# Patient Record
Sex: MALE | Race: ASIAN | NOT HISPANIC OR LATINO | Employment: OTHER | URBAN - METROPOLITAN AREA
[De-identification: names, ages, dates, MRNs, and addresses within clinical notes are randomized per-mention and may not be internally consistent; named-entity substitution may affect disease eponyms.]

---

## 2022-01-13 ENCOUNTER — TELEPHONE (OUTPATIENT)
Dept: SCHEDULING | Facility: IMAGING CENTER | Age: 62
End: 2022-01-13

## 2022-02-16 ENCOUNTER — OFFICE VISIT (OUTPATIENT)
Dept: MEDICAL GROUP | Facility: PHYSICIAN GROUP | Age: 62
End: 2022-02-16
Payer: COMMERCIAL

## 2022-02-16 VITALS
OXYGEN SATURATION: 97 % | HEIGHT: 68 IN | RESPIRATION RATE: 20 BRPM | SYSTOLIC BLOOD PRESSURE: 118 MMHG | HEART RATE: 64 BPM | WEIGHT: 159.4 LBS | BODY MASS INDEX: 24.16 KG/M2 | TEMPERATURE: 97.8 F | DIASTOLIC BLOOD PRESSURE: 84 MMHG

## 2022-02-16 DIAGNOSIS — Z12.11 SCREENING FOR COLON CANCER: ICD-10-CM

## 2022-02-16 DIAGNOSIS — M17.0 PRIMARY OSTEOARTHRITIS OF BOTH KNEES: ICD-10-CM

## 2022-02-16 DIAGNOSIS — Z00.00 ENCOUNTER FOR MEDICAL EXAMINATION TO ESTABLISH CARE: ICD-10-CM

## 2022-02-16 DIAGNOSIS — E78.00 HYPERCHOLESTEROLEMIA: ICD-10-CM

## 2022-02-16 DIAGNOSIS — E03.9 ACQUIRED HYPOTHYROIDISM: ICD-10-CM

## 2022-02-16 DIAGNOSIS — R73.09 ELEVATED HEMOGLOBIN A1C: ICD-10-CM

## 2022-02-16 PROCEDURE — 99204 OFFICE O/P NEW MOD 45 MIN: CPT | Performed by: STUDENT IN AN ORGANIZED HEALTH CARE EDUCATION/TRAINING PROGRAM

## 2022-02-16 RX ORDER — AMOXICILLIN 500 MG
3000 CAPSULE ORAL DAILY
COMMUNITY

## 2022-02-16 RX ORDER — LEVOTHYROXINE SODIUM 112 UG/1
112 TABLET ORAL
COMMUNITY
End: 2022-02-16 | Stop reason: SDUPTHER

## 2022-02-16 RX ORDER — LEVOTHYROXINE SODIUM 112 UG/1
112 TABLET ORAL
Qty: 90 TABLET | Refills: 3 | Status: SHIPPED | OUTPATIENT
Start: 2022-02-16 | End: 2023-02-13

## 2022-02-16 ASSESSMENT — PATIENT HEALTH QUESTIONNAIRE - PHQ9: CLINICAL INTERPRETATION OF PHQ2 SCORE: 0

## 2022-02-16 NOTE — PROGRESS NOTES
"Subjective:     CC:  Diagnoses of Elevated hemoglobin A1c, Acquired hypothyroidism, Encounter for medical examination to establish care, Screening for colon cancer, Hypercholesterolemia, and Primary osteoarthritis of both knees were pertinent to this visit.    HISTORY OF THE PRESENT ILLNESS: Patient is a 61 y.o. male. This pleasant patient is here today to establish care.    1.  Elevated hemoglobin A1c  Longstanding.  Patient continues to monitor his diet.    2.  Acquired hypothyroidism  Longstanding.  Currently under treatment with levothyroxine 112 micrograms daily.  He reports no symptoms of hyper/hypothyroidism    3.  Hypercholesterolemia  Longstanding.  He is actively treating with a low-cholesterol diet.  Patient is currently under no medical treatment.      Active Diagnosis:    Patient Active Problem List   Diagnosis   • Elevated hemoglobin A1c   • Acquired hypothyroidism   • Hypercholesterolemia   • Primary osteoarthritis of both knees          Current Outpatient Medications Ordered in Epic   Medication Sig Dispense Refill   • Multiple Vitamin (MULTIVITAMIN ADULT PO) Take 1 Tablet by mouth every day.     • Omega-3 Fatty Acids (FISH OIL) 1200 MG Cap Take 3,000 mg by mouth every day.     • levothyroxine (SYNTHROID) 112 MCG Tab Take 1 Tablet by mouth every morning on an empty stomach. 90 Tablet 3     No current Epic-ordered facility-administered medications on file.     ROS:   Gen: No fevers/chills, no changes in weight  HEENT: No changes in vision/hearing, sore throat.  Pulm: No cough, unexplained SOB.  CV: No chest pain/pressure, no palpitations  GI: No nausea/vomiting, no diarrhea  : No dysuria/nocturia  MSk: No myalgias  Skin: No rash/skin changes    Treatments make sure through this  Objective:     Exam: /84 (BP Location: Left arm, Patient Position: Sitting, BP Cuff Size: Adult)   Pulse 64   Temp 36.6 °C (97.8 °F) (Temporal)   Resp 20   Ht 1.72 m (5' 7.7\")   Wt 72.3 kg (159 lb 6.4 oz)   SpO2 " 97%  Body mass index is 24.45 kg/m².    General: Normal appearing. No distress.  HEENT: Normocephalic. Eyes conjunctiva clear lids without ptosis, pupils equal and reactive to light accommodation. Ears normal shape and contour, canals are clear bilaterally, tympanic membranes are benign, nasal mucosa benign, oropharynx is without erythema, edema or exudates.   Neck: Supple without JVD Synagis 9 abdominal exams that. Thyroid is not enlarged.  Pulmonary: Clear to ausculation.  Normal effort. No rales, ronchi, or wheezing.  Cardiovascular: Regular rate and rhythm without murmur. Radial pulses are intact and equal bilaterally.  Abdomen: Nondistended.  Neurologic: Grossly nonfocal.  CN II through XII intact.  Lymph: No cervical or supraclavicular lymph nodes are palpable  Skin: Warm and dry.  No obvious lesions.  Musculoskeletal: Normal gait. No extremity cyanosis, clubbing, or edema.  Psych: Normal mood and affect. Alert and oriented x3. Judgment and insight are normal.    A chaperone was offered to the patient during today's exam. Patient declined chaperone.    Labs:   8/4/2021:  -Lipid profile, total cholesterol 216, triglycerides 97, HDL 55,   -Hemoglobin A1c, 6.1  -BMP, elevated fasting glucose of 106  -TSH 0.65  -PSA 0.33    Assessment & Plan:   61 y.o. male with the following -    1. Elevated hemoglobin A1c  -Chronic, stable.  -Patient continues to exercise and eat a healthy diet low in carbohydrates.    2. Acquired hypothyroidism  -Chronic, stable.  -Continue levothyroxine 112 mcg daily.  Dispense 90.  Refill 3.    3.  Hypercholesterolemia  -Chronic, stable.  Current ASCVD 7.5 percent.  -I discussed the option to treat with a statin type medication with the patient today.  He declines to do so at this point in time.  I let him know that this remains an option on the table for him.  He does have a significant family history of cardiovascular disease on his father side with many deaths in persons in their  40s and 50s.    4. Healthcare maintenance.  -We discussed diet/exercise/healthy weight maintenance.  Discussed the need for biannual dentistry visits and always wears seatbelt.  I recommend the patient receive his Shingrix vaccine at the local pharmacy of his choice.  - Referral to GI for Colonoscopy      Return in about 6 months (around 8/16/2022) for Hyperthyroidism.  We will obtain hepatitis C screening with TSH checked.    Please note that this dictation was created using voice recognition software. I have made every reasonable attempt to correct obvious errors, but I expect that there are errors of grammar and possibly content that I did not discover before finalizing the note.      Damian Mcgee PA-C 2/16/2022

## 2022-08-16 ENCOUNTER — OFFICE VISIT (OUTPATIENT)
Dept: MEDICAL GROUP | Facility: PHYSICIAN GROUP | Age: 62
End: 2022-08-16
Payer: COMMERCIAL

## 2022-08-16 VITALS
HEART RATE: 69 BPM | HEIGHT: 68 IN | DIASTOLIC BLOOD PRESSURE: 66 MMHG | RESPIRATION RATE: 16 BRPM | SYSTOLIC BLOOD PRESSURE: 122 MMHG | WEIGHT: 158.8 LBS | OXYGEN SATURATION: 100 % | BODY MASS INDEX: 24.07 KG/M2 | TEMPERATURE: 98.7 F

## 2022-08-16 DIAGNOSIS — E03.9 ACQUIRED HYPOTHYROIDISM: ICD-10-CM

## 2022-08-16 DIAGNOSIS — Z11.59 ENCOUNTER FOR HEPATITIS C SCREENING TEST FOR LOW RISK PATIENT: ICD-10-CM

## 2022-08-16 DIAGNOSIS — R73.09 ELEVATED HEMOGLOBIN A1C: ICD-10-CM

## 2022-08-16 DIAGNOSIS — E78.00 HYPERCHOLESTEROLEMIA: ICD-10-CM

## 2022-08-16 LAB
HBA1C MFR BLD: 6.1 % (ref 0–5.6)
INT CON NEG: NEGATIVE
INT CON POS: POSITIVE

## 2022-08-16 PROCEDURE — 83036 HEMOGLOBIN GLYCOSYLATED A1C: CPT | Performed by: STUDENT IN AN ORGANIZED HEALTH CARE EDUCATION/TRAINING PROGRAM

## 2022-08-16 PROCEDURE — 99214 OFFICE O/P EST MOD 30 MIN: CPT | Performed by: STUDENT IN AN ORGANIZED HEALTH CARE EDUCATION/TRAINING PROGRAM

## 2022-08-16 NOTE — PROGRESS NOTES
"CC:  Diagnoses of Elevated hemoglobin A1c, Acquired hypothyroidism, Hypercholesterolemia, and Encounter for hepatitis C screening test for low risk patient were pertinent to this visit.    HISTORY OF THE PRESENT ILLNESS: Patient is a 62 y.o. male. This pleasant patient is here today to discuss:    1. Elevated A1c  Longstanding history of elevated hemoglobin A1c.  Patient does find some difficulty in exercising due to his knee arthritis.  He is careful with his diet.    2. Acquired Hypothyroidism  Longstanding.  Patient is fully compliant with his levothyroxine 112 mcg daily.  He reports no symptoms of hyper/hypothyroidism.    3. Hypercholesterolemia.  Patient is not currently treating this with any medication.  He is careful with his diet.    No problem-specific Assessment & Plan notes found for this encounter.    Current Outpatient Medications Ordered in Epic   Medication Sig Dispense Refill    Multiple Vitamin (MULTIVITAMIN ADULT PO) Take 1 Tablet by mouth every day.      Omega-3 Fatty Acids (FISH OIL) 1200 MG Cap Take 3,000 mg by mouth every day.      levothyroxine (SYNTHROID) 112 MCG Tab Take 1 Tablet by mouth every morning on an empty stomach. 90 Tablet 3     No current Epic-ordered facility-administered medications on file.     ROS:   Gen: no fevers/chills, unplanned changes in weight  Pulm: no sob, no cough  CV: no chest pain/pressure, no palpitations  GI: no nausea/vomiting, no diarrhea  : no dysuria/nocturia > once per night  Skin: no rash  Neuro: no headaches, no numbness/tingling      Objective:     Exam: /66 (BP Location: Left arm, Patient Position: Sitting, BP Cuff Size: Adult)   Pulse 69   Temp 37.1 °C (98.7 °F) (Temporal)   Resp 16   Ht 1.72 m (5' 7.7\")   Wt 72 kg (158 lb 12.8 oz)   SpO2 100%  Body mass index is 24.36 kg/m².    General: Normal appearing. No distress.  HEENT: Normocephalic. Eyes conjunctiva clear lids without ptosis, pupils equal and reactive to light accommodation, ears " normal shape and contour, canals are clear bilaterally, tympanic membranes are benign, nasal mucosa benign, oropharynx is without erythema, edema or exudates.   Neck: Supple without JVD. Thyroid is not enlarged.  Pulmonary: Clear to ausculation.  Normal effort. No rales, ronchi, or wheezing.  Cardiovascular: Regular rate and rhythm without murmur. Radial pulses are intact and equal bilaterally.  Neurologic: Grossly nonfocal  Lymph: No cervical or supraclavicular lymph nodes are palpable  Skin: Warm and dry.  No obvious lesions.  Musculoskeletal: Normal gait. No extremity cyanosis, clubbing, or edema.  Psych: Normal mood and affect. Alert and oriented x3. Judgment and insight is normal.    A chaperone was offered to the patient during today's exam. Patient declined chaperone.    Labs:   8/16/2022:   -POCT hemoglobin A1c 6.1%    8/4/2021:  -Hemoglobin A1c 6.1%  -TSH 0.65  -Lipid profile showing total cholesterol 216, triglycerides 97, HDL 55, .      Assessment & Plan:   62 y.o. male with the following -    1. Elevated A1c  -Chronic, stable.  -I have encouraged the patient to continue to manage his diet very carefully and to attempt low impact exercises such as cycling or water aerobics to improve his hemoglobin A1c.    2. Acquired Hypothyroidism  -Chronic, stable.  -Continue levothyroxine 112 mcg daily.  -TSH with reflex T4.    3. Hypercholesterolemia.   -Chronic.  -Lipid profile.    Return in about 1 year (around 8/16/2023).    Please note that this dictation was created using voice recognition software. I have made every reasonable attempt to correct obvious errors, but I expect that there are errors of grammar and possibly content that I did not discover before finalizing the note.    Damian Mcgee PA-C 8/16/2022

## 2022-08-19 ENCOUNTER — HOSPITAL ENCOUNTER (OUTPATIENT)
Dept: LAB | Facility: MEDICAL CENTER | Age: 62
End: 2022-08-19
Attending: STUDENT IN AN ORGANIZED HEALTH CARE EDUCATION/TRAINING PROGRAM
Payer: COMMERCIAL

## 2022-08-19 DIAGNOSIS — Z11.59 ENCOUNTER FOR HEPATITIS C SCREENING TEST FOR LOW RISK PATIENT: ICD-10-CM

## 2022-08-19 DIAGNOSIS — E78.00 HYPERCHOLESTEROLEMIA: ICD-10-CM

## 2022-08-19 DIAGNOSIS — E03.9 ACQUIRED HYPOTHYROIDISM: ICD-10-CM

## 2022-08-19 LAB
CHOLEST SERPL-MCNC: 199 MG/DL (ref 100–199)
HCV AB SER QL: NORMAL
HDLC SERPL-MCNC: 41 MG/DL
LDLC SERPL CALC-MCNC: 138 MG/DL
TRIGL SERPL-MCNC: 98 MG/DL (ref 0–149)
TSH SERPL DL<=0.005 MIU/L-ACNC: 0.54 UIU/ML (ref 0.38–5.33)

## 2022-08-19 PROCEDURE — 86803 HEPATITIS C AB TEST: CPT

## 2022-08-19 PROCEDURE — 84443 ASSAY THYROID STIM HORMONE: CPT

## 2022-08-19 PROCEDURE — 80061 LIPID PANEL: CPT

## 2022-08-19 PROCEDURE — 36415 COLL VENOUS BLD VENIPUNCTURE: CPT

## 2023-02-13 RX ORDER — LEVOTHYROXINE SODIUM 112 UG/1
TABLET ORAL
Qty: 90 TABLET | Refills: 3 | Status: SHIPPED | OUTPATIENT
Start: 2023-02-13 | End: 2023-08-17 | Stop reason: SDUPTHER

## 2023-08-04 ENCOUNTER — DOCUMENTATION (OUTPATIENT)
Dept: HEALTH INFORMATION MANAGEMENT | Facility: OTHER | Age: 63
End: 2023-08-04
Payer: COMMERCIAL

## 2023-08-17 ENCOUNTER — OFFICE VISIT (OUTPATIENT)
Dept: MEDICAL GROUP | Facility: PHYSICIAN GROUP | Age: 63
End: 2023-08-17
Payer: COMMERCIAL

## 2023-08-17 VITALS
DIASTOLIC BLOOD PRESSURE: 72 MMHG | SYSTOLIC BLOOD PRESSURE: 104 MMHG | BODY MASS INDEX: 21.55 KG/M2 | OXYGEN SATURATION: 95 % | HEIGHT: 68 IN | WEIGHT: 142.2 LBS | TEMPERATURE: 97.9 F | HEART RATE: 82 BPM

## 2023-08-17 DIAGNOSIS — E03.9 ACQUIRED HYPOTHYROIDISM: ICD-10-CM

## 2023-08-17 DIAGNOSIS — R73.09 ELEVATED HEMOGLOBIN A1C: ICD-10-CM

## 2023-08-17 DIAGNOSIS — E78.00 HYPERCHOLESTEROLEMIA: ICD-10-CM

## 2023-08-17 DIAGNOSIS — Z00.00 HEALTHCARE MAINTENANCE: ICD-10-CM

## 2023-08-17 PROCEDURE — 3078F DIAST BP <80 MM HG: CPT | Performed by: STUDENT IN AN ORGANIZED HEALTH CARE EDUCATION/TRAINING PROGRAM

## 2023-08-17 PROCEDURE — 99214 OFFICE O/P EST MOD 30 MIN: CPT | Performed by: STUDENT IN AN ORGANIZED HEALTH CARE EDUCATION/TRAINING PROGRAM

## 2023-08-17 PROCEDURE — 3074F SYST BP LT 130 MM HG: CPT | Performed by: STUDENT IN AN ORGANIZED HEALTH CARE EDUCATION/TRAINING PROGRAM

## 2023-08-17 RX ORDER — LEVOTHYROXINE SODIUM 112 UG/1
112 TABLET ORAL
Qty: 90 TABLET | Refills: 3 | Status: SHIPPED | OUTPATIENT
Start: 2023-08-17 | End: 2023-08-24 | Stop reason: DRUGHIGH

## 2023-08-17 ASSESSMENT — PATIENT HEALTH QUESTIONNAIRE - PHQ9: CLINICAL INTERPRETATION OF PHQ2 SCORE: 0

## 2023-08-17 NOTE — PROGRESS NOTES
"Subjective:     CC:  Diagnoses of Healthcare maintenance, Acquired hypothyroidism, Elevated hemoglobin A1c, and Hypercholesterolemia were pertinent to this visit.    HISTORY OF THE PRESENT ILLNESS: Patient is a 63 y.o. male. This pleasant patient is here today to discuss:    1. Healthcare maintenance  Patient reports a mostly vegetarian diet, low in carbohydrates.  He gets regular dentistry.  He walks 10 to 15 minutes daily.    2. Acquired hypothyroidism  Levothyroxine 112 mcg daily.  Patient reports full compliance.  No symptoms of hyper/hypothyroidism.    3. Elevated hemoglobin A1c    4. Hypercholesterolemia        Active Diagnosis:    Patient Active Problem List   Diagnosis    Elevated hemoglobin A1c    Acquired hypothyroidism    Hypercholesterolemia    Primary osteoarthritis of both knees      Current Outpatient Medications Ordered in Epic   Medication Sig Dispense Refill    levothyroxine (SYNTHROID) 112 MCG Tab Take 1 Tablet by mouth every morning on an empty stomach. 90 Tablet 3    Multiple Vitamin (MULTIVITAMIN ADULT PO) Take 1 Tablet by mouth every day.      Omega-3 Fatty Acids (FISH OIL) 1200 MG Cap Take 3,000 mg by mouth every day.       No current [facility]-ordered facility-administered medications on file.     ROS:   Gen: No fevers/chills, no changes in weight  HEENT: No changes in vision/hearing, sore throat.  Pulm: No cough, unexplained SOB.  CV: No chest pain/pressure, no palpitations  GI: No nausea/vomiting, no diarrhea  : No dysuria/nocturia  MSk: No myalgias  Skin: No rash/skin changes  Neuro: No headaches, no numbness/tingling  Heme/Lymph: no easy bruising      Objective:     Exam: /72 (BP Location: Left arm, Patient Position: Sitting, BP Cuff Size: Adult)   Pulse 82   Temp 36.6 °C (97.9 °F) (Temporal)   Ht 1.727 m (5' 8\")   Wt 64.5 kg (142 lb 3.2 oz)   SpO2 95%  Body mass index is 21.62 kg/m².    General: Normal appearing. No distress.  HEENT: Normocephalic. Eyes conjunctiva clear lids " without ptosis. Pupils equal and reactive to light accommodation. Ears normal shape and contour. Canals are clear bilaterally, tympanic membranes are benign. Nasal mucosa benign, oropharynx is without erythema, edema or exudates.   Neck: Supple without JVD. Thyroid is not enlarged.  Pulmonary: Clear to ausculation.  Normal effort. No rales, ronchi, or wheezing.  Cardiovascular: Regular rate and rhythm without murmur. Radial pulses are intact and equal bilaterally.  Abdomen: Nondistended   neurologic: Grossly nonfocal.  CN II through XII intact.  Lymph: No cervical or supraclavicular lymph nodes are palpable  Skin: Warm and dry.  No obvious lesions.  Musculoskeletal: Normal gait. No extremity cyanosis, clubbing, or edema.  Psych: Normal mood and affect. Alert and oriented x3. Judgment and insight are normal.        Assessment & Plan:   63 y.o. male with the following -    Labs:   8/4/2021:  -BMP showing glucose 106.    8/6/2022:  -Hemoglobin A1c 6.1%.  -Lipid profile showing total cholesterol 189, triglycerides 98, HDL 41,   -TSH 0.54    1. Healthcare maintenance  -Patient's good dentistry and diet regimens were reinforced at today's visit.  Recommend increasing exercise to 30 minutes daily.  - CBC WITHOUT DIFFERENTIAL; Future  - Comp Metabolic Panel; Future  - Lipid Profile; Future  - TSH WITH REFLEX TO FT4; Future  - HEMOGLOBIN A1C; Future    2. Acquired hypothyroidism  -Chronic, stable.  - TSH WITH REFLEX TO FT4; Future  - levothyroxine (SYNTHROID) 112 MCG Tab; Take 1 Tablet by mouth every morning on an empty stomach.  Dispense: 90 Tablet; Refill: 3    3. Elevated hemoglobin A1c  -Chronic.  - Comp Metabolic Panel; Future  - HEMOGLOBIN A1C; Future    4. Hypercholesterolemia  -Chronic, subthreshold for medical intervention.  - Lipid Profile; Future        No follow-ups on file.    Please note that this dictation was created using voice recognition software. I have made every reasonable attempt to correct  obvious errors, but I expect that there are errors of grammar and possibly content that I did not discover before finalizing the note.      Damian Mcgee PA-C 8/17/2023

## 2023-08-23 ENCOUNTER — HOSPITAL ENCOUNTER (OUTPATIENT)
Dept: LAB | Facility: MEDICAL CENTER | Age: 63
End: 2023-08-23
Attending: STUDENT IN AN ORGANIZED HEALTH CARE EDUCATION/TRAINING PROGRAM
Payer: COMMERCIAL

## 2023-08-23 DIAGNOSIS — E03.9 ACQUIRED HYPOTHYROIDISM: ICD-10-CM

## 2023-08-23 DIAGNOSIS — R73.09 ELEVATED HEMOGLOBIN A1C: ICD-10-CM

## 2023-08-23 DIAGNOSIS — Z00.00 HEALTHCARE MAINTENANCE: ICD-10-CM

## 2023-08-23 DIAGNOSIS — E78.00 HYPERCHOLESTEROLEMIA: ICD-10-CM

## 2023-08-23 LAB
ALBUMIN SERPL BCP-MCNC: 4.3 G/DL (ref 3.2–4.9)
ALBUMIN/GLOB SERPL: 1.7 G/DL
ALP SERPL-CCNC: 53 U/L (ref 30–99)
ALT SERPL-CCNC: 24 U/L (ref 2–50)
ANION GAP SERPL CALC-SCNC: 12 MMOL/L (ref 7–16)
AST SERPL-CCNC: 23 U/L (ref 12–45)
BILIRUB SERPL-MCNC: 1.1 MG/DL (ref 0.1–1.5)
BUN SERPL-MCNC: 18 MG/DL (ref 8–22)
CALCIUM ALBUM COR SERPL-MCNC: 9.4 MG/DL (ref 8.5–10.5)
CALCIUM SERPL-MCNC: 9.6 MG/DL (ref 8.5–10.5)
CHLORIDE SERPL-SCNC: 103 MMOL/L (ref 96–112)
CHOLEST SERPL-MCNC: 156 MG/DL (ref 100–199)
CO2 SERPL-SCNC: 25 MMOL/L (ref 20–33)
CREAT SERPL-MCNC: 1.03 MG/DL (ref 0.5–1.4)
ERYTHROCYTE [DISTWIDTH] IN BLOOD BY AUTOMATED COUNT: 45.1 FL (ref 35.9–50)
EST. AVERAGE GLUCOSE BLD GHB EST-MCNC: 126 MG/DL
FASTING STATUS PATIENT QL REPORTED: NORMAL
GFR SERPLBLD CREATININE-BSD FMLA CKD-EPI: 82 ML/MIN/1.73 M 2
GLOBULIN SER CALC-MCNC: 2.5 G/DL (ref 1.9–3.5)
GLUCOSE SERPL-MCNC: 87 MG/DL (ref 65–99)
HBA1C MFR BLD: 6 % (ref 4–5.6)
HCT VFR BLD AUTO: 47 % (ref 42–52)
HDLC SERPL-MCNC: 48 MG/DL
HGB BLD-MCNC: 15.4 G/DL (ref 14–18)
LDLC SERPL CALC-MCNC: 98 MG/DL
MCH RBC QN AUTO: 31.8 PG (ref 27–33)
MCHC RBC AUTO-ENTMCNC: 32.8 G/DL (ref 32.3–36.5)
MCV RBC AUTO: 96.9 FL (ref 81.4–97.8)
PLATELET # BLD AUTO: 157 K/UL (ref 164–446)
PMV BLD AUTO: 10.8 FL (ref 9–12.9)
POTASSIUM SERPL-SCNC: 4.9 MMOL/L (ref 3.6–5.5)
PROT SERPL-MCNC: 6.8 G/DL (ref 6–8.2)
RBC # BLD AUTO: 4.85 M/UL (ref 4.7–6.1)
SODIUM SERPL-SCNC: 140 MMOL/L (ref 135–145)
TRIGL SERPL-MCNC: 50 MG/DL (ref 0–149)
TSH SERPL DL<=0.005 MIU/L-ACNC: 0.12 UIU/ML (ref 0.38–5.33)
WBC # BLD AUTO: 3.3 K/UL (ref 4.8–10.8)

## 2023-08-23 PROCEDURE — 80061 LIPID PANEL: CPT

## 2023-08-23 PROCEDURE — 80053 COMPREHEN METABOLIC PANEL: CPT

## 2023-08-23 PROCEDURE — 36415 COLL VENOUS BLD VENIPUNCTURE: CPT

## 2023-08-23 PROCEDURE — 83036 HEMOGLOBIN GLYCOSYLATED A1C: CPT

## 2023-08-23 PROCEDURE — 85027 COMPLETE CBC AUTOMATED: CPT

## 2023-08-23 PROCEDURE — 84439 ASSAY OF FREE THYROXINE: CPT

## 2023-08-23 PROCEDURE — 84443 ASSAY THYROID STIM HORMONE: CPT

## 2023-08-24 DIAGNOSIS — E03.9 ACQUIRED HYPOTHYROIDISM: ICD-10-CM

## 2023-08-24 LAB — T4 FREE SERPL-MCNC: 2.21 NG/DL (ref 0.93–1.7)

## 2023-08-24 RX ORDER — LEVOTHYROXINE SODIUM 0.1 MG/1
100 TABLET ORAL
Qty: 30 TABLET | Refills: 2 | Status: SHIPPED | OUTPATIENT
Start: 2023-08-24 | End: 2023-09-18

## 2023-10-09 ENCOUNTER — HOSPITAL ENCOUNTER (OUTPATIENT)
Dept: LAB | Facility: MEDICAL CENTER | Age: 63
End: 2023-10-09
Attending: STUDENT IN AN ORGANIZED HEALTH CARE EDUCATION/TRAINING PROGRAM
Payer: COMMERCIAL

## 2023-10-09 DIAGNOSIS — E03.9 ACQUIRED HYPOTHYROIDISM: ICD-10-CM

## 2023-10-09 LAB — TSH SERPL DL<=0.005 MIU/L-ACNC: 4.75 UIU/ML (ref 0.38–5.33)

## 2023-10-09 PROCEDURE — 84443 ASSAY THYROID STIM HORMONE: CPT

## 2023-10-09 PROCEDURE — 36415 COLL VENOUS BLD VENIPUNCTURE: CPT

## 2023-12-27 RX ORDER — LEVOTHYROXINE SODIUM 0.1 MG/1
100 TABLET ORAL
Qty: 30 TABLET | Refills: 0 | Status: SHIPPED | OUTPATIENT
Start: 2023-12-27 | End: 2024-01-03 | Stop reason: SDUPTHER

## 2023-12-27 NOTE — TELEPHONE ENCOUNTER
Received request via: Patient    Was the patient seen in the last year in this department? Yes. Also has [facility] appt with Juan Carlos Myles 01/03/2023    Does the patient have an active prescription (recently filled or refills available) for medication(s) requested? No    Does the patient have residential Plus and need 100 day supply (blood pressure, diabetes and cholesterol meds only)? Patient does not have SCP

## 2024-01-03 ENCOUNTER — OFFICE VISIT (OUTPATIENT)
Dept: MEDICAL GROUP | Facility: PHYSICIAN GROUP | Age: 64
End: 2024-01-03
Payer: COMMERCIAL

## 2024-01-03 VITALS
HEART RATE: 57 BPM | DIASTOLIC BLOOD PRESSURE: 70 MMHG | SYSTOLIC BLOOD PRESSURE: 100 MMHG | HEIGHT: 68 IN | WEIGHT: 147.2 LBS | BODY MASS INDEX: 22.31 KG/M2 | OXYGEN SATURATION: 97 % | TEMPERATURE: 96.9 F

## 2024-01-03 DIAGNOSIS — E78.00 HYPERCHOLESTEROLEMIA: ICD-10-CM

## 2024-01-03 DIAGNOSIS — Z23 NEED FOR VACCINATION: ICD-10-CM

## 2024-01-03 DIAGNOSIS — R73.03 PREDIABETES: ICD-10-CM

## 2024-01-03 DIAGNOSIS — D69.6 THROMBOCYTOPENIA (HCC): ICD-10-CM

## 2024-01-03 DIAGNOSIS — E03.9 ACQUIRED HYPOTHYROIDISM: ICD-10-CM

## 2024-01-03 PROCEDURE — 3074F SYST BP LT 130 MM HG: CPT

## 2024-01-03 PROCEDURE — 90686 IIV4 VACC NO PRSV 0.5 ML IM: CPT

## 2024-01-03 PROCEDURE — 3078F DIAST BP <80 MM HG: CPT

## 2024-01-03 PROCEDURE — 90471 IMMUNIZATION ADMIN: CPT

## 2024-01-03 PROCEDURE — 99214 OFFICE O/P EST MOD 30 MIN: CPT | Mod: 25

## 2024-01-03 RX ORDER — LEVOTHYROXINE SODIUM 0.1 MG/1
100 TABLET ORAL
Qty: 30 TABLET | Refills: 0 | Status: SHIPPED | OUTPATIENT
Start: 2024-01-03 | End: 2024-02-16

## 2024-01-03 SDOH — HEALTH STABILITY: PHYSICAL HEALTH: ON AVERAGE, HOW MANY MINUTES DO YOU ENGAGE IN EXERCISE AT THIS LEVEL?: 20 MIN

## 2024-01-03 SDOH — ECONOMIC STABILITY: HOUSING INSECURITY: IN THE LAST 12 MONTHS, HOW MANY PLACES HAVE YOU LIVED?: 1

## 2024-01-03 SDOH — ECONOMIC STABILITY: FOOD INSECURITY: WITHIN THE PAST 12 MONTHS, THE FOOD YOU BOUGHT JUST DIDN'T LAST AND YOU DIDN'T HAVE MONEY TO GET MORE.: NEVER TRUE

## 2024-01-03 SDOH — ECONOMIC STABILITY: HOUSING INSECURITY
IN THE LAST 12 MONTHS, WAS THERE A TIME WHEN YOU DID NOT HAVE A STEADY PLACE TO SLEEP OR SLEPT IN A SHELTER (INCLUDING NOW)?: NO

## 2024-01-03 SDOH — ECONOMIC STABILITY: INCOME INSECURITY: IN THE LAST 12 MONTHS, WAS THERE A TIME WHEN YOU WERE NOT ABLE TO PAY THE MORTGAGE OR RENT ON TIME?: NO

## 2024-01-03 SDOH — HEALTH STABILITY: MENTAL HEALTH
STRESS IS WHEN SOMEONE FEELS TENSE, NERVOUS, ANXIOUS, OR CAN'T SLEEP AT NIGHT BECAUSE THEIR MIND IS TROUBLED. HOW STRESSED ARE YOU?: ONLY A LITTLE

## 2024-01-03 SDOH — ECONOMIC STABILITY: TRANSPORTATION INSECURITY
IN THE PAST 12 MONTHS, HAS THE LACK OF TRANSPORTATION KEPT YOU FROM MEDICAL APPOINTMENTS OR FROM GETTING MEDICATIONS?: NO

## 2024-01-03 SDOH — HEALTH STABILITY: PHYSICAL HEALTH

## 2024-01-03 SDOH — ECONOMIC STABILITY: FOOD INSECURITY: WITHIN THE PAST 12 MONTHS, YOU WORRIED THAT YOUR FOOD WOULD RUN OUT BEFORE YOU GOT MONEY TO BUY MORE.: NEVER TRUE

## 2024-01-03 SDOH — ECONOMIC STABILITY: TRANSPORTATION INSECURITY
IN THE PAST 12 MONTHS, HAS LACK OF TRANSPORTATION KEPT YOU FROM MEETINGS, WORK, OR FROM GETTING THINGS NEEDED FOR DAILY LIVING?: NO

## 2024-01-03 SDOH — ECONOMIC STABILITY: INCOME INSECURITY: HOW HARD IS IT FOR YOU TO PAY FOR THE VERY BASICS LIKE FOOD, HOUSING, MEDICAL CARE, AND HEATING?: NOT HARD AT ALL

## 2024-01-03 SDOH — ECONOMIC STABILITY: TRANSPORTATION INSECURITY
IN THE PAST 12 MONTHS, HAS LACK OF RELIABLE TRANSPORTATION KEPT YOU FROM MEDICAL APPOINTMENTS, MEETINGS, WORK OR FROM GETTING THINGS NEEDED FOR DAILY LIVING?: NO

## 2024-01-03 ASSESSMENT — SOCIAL DETERMINANTS OF HEALTH (SDOH)
HOW OFTEN DO YOU ATTEND CHURCH OR RELIGIOUS SERVICES?: NEVER
HOW OFTEN DO YOU HAVE A DRINK CONTAINING ALCOHOL: MONTHLY OR LESS
HOW HARD IS IT FOR YOU TO PAY FOR THE VERY BASICS LIKE FOOD, HOUSING, MEDICAL CARE, AND HEATING?: NOT HARD AT ALL
HOW OFTEN DO YOU ATTENT MEETINGS OF THE CLUB OR ORGANIZATION YOU BELONG TO?: PATIENT DECLINED
WITHIN THE PAST 12 MONTHS, YOU WORRIED THAT YOUR FOOD WOULD RUN OUT BEFORE YOU GOT THE MONEY TO BUY MORE: NEVER TRUE
HOW OFTEN DO YOU GET TOGETHER WITH FRIENDS OR RELATIVES?: NEVER
HOW OFTEN DO YOU ATTEND CHURCH OR RELIGIOUS SERVICES?: NEVER
HOW OFTEN DO YOU GET TOGETHER WITH FRIENDS OR RELATIVES?: NEVER
HOW MANY DRINKS CONTAINING ALCOHOL DO YOU HAVE ON A TYPICAL DAY WHEN YOU ARE DRINKING: 1 OR 2
HOW OFTEN DO YOU HAVE SIX OR MORE DRINKS ON ONE OCCASION: NEVER
DO YOU BELONG TO ANY CLUBS OR ORGANIZATIONS SUCH AS CHURCH GROUPS UNIONS, FRATERNAL OR ATHLETIC GROUPS, OR SCHOOL GROUPS?: NO
IN A TYPICAL WEEK, HOW MANY TIMES DO YOU TALK ON THE PHONE WITH FAMILY, FRIENDS, OR NEIGHBORS?: NEVER
IN A TYPICAL WEEK, HOW MANY TIMES DO YOU TALK ON THE PHONE WITH FAMILY, FRIENDS, OR NEIGHBORS?: NEVER
DO YOU BELONG TO ANY CLUBS OR ORGANIZATIONS SUCH AS CHURCH GROUPS UNIONS, FRATERNAL OR ATHLETIC GROUPS, OR SCHOOL GROUPS?: NO
HOW OFTEN DO YOU ATTENT MEETINGS OF THE CLUB OR ORGANIZATION YOU BELONG TO?: PATIENT DECLINED

## 2024-01-03 ASSESSMENT — ENCOUNTER SYMPTOMS
BLURRED VISION: 0
MYALGIAS: 0
CONSTIPATION: 0
FEVER: 0
DIZZINESS: 0
HEADACHES: 0
NAUSEA: 0
WEAKNESS: 0
SHORTNESS OF BREATH: 0
ABDOMINAL PAIN: 0
WEIGHT LOSS: 0
COUGH: 0
CHILLS: 0
VOMITING: 0
DIARRHEA: 0

## 2024-01-03 ASSESSMENT — FIBROSIS 4 INDEX: FIB4 SCORE: 1.85

## 2024-01-03 ASSESSMENT — PATIENT HEALTH QUESTIONNAIRE - PHQ9: CLINICAL INTERPRETATION OF PHQ2 SCORE: 0

## 2024-01-03 ASSESSMENT — LIFESTYLE VARIABLES
HOW OFTEN DO YOU HAVE A DRINK CONTAINING ALCOHOL: MONTHLY OR LESS
HOW OFTEN DO YOU HAVE SIX OR MORE DRINKS ON ONE OCCASION: NEVER
SKIP TO QUESTIONS 9-10: 1
HOW MANY STANDARD DRINKS CONTAINING ALCOHOL DO YOU HAVE ON A TYPICAL DAY: 1 OR 2
AUDIT-C TOTAL SCORE: 1

## 2024-01-03 NOTE — ASSESSMENT & PLAN NOTE
Chronic, controlled. On levothyroxine 100 mcg per day and tolerating it well. Labs reviewed and last TSH in October 2023 was bio chemically stable, therefore continue current medication regimen.  Patient reports good energy level on the medication. Patient denies insomnia, tremor or change in appetite.  Patient is taking the medication on an empty stomach in the morning and waiting at least 30 minutes before eating.  TSH with reflex T4 to be drawn at least annually.

## 2024-01-03 NOTE — ASSESSMENT & PLAN NOTE
Chronic, controlled. The  last cholesterol panel in August 2023 was all within normal limits so we will continue the current lifestyle management  Recommend continuation of diligent efforts towards dietary improvements (Mediterranean Diet) and moderate intensity exercise at least 30 minutes per day most days of the week.  The 10-year ASCVD risk score (Tomas AGRAWAL, et al., 2019) is: 5.6%

## 2024-01-03 NOTE — PROGRESS NOTES
"Subjective:     CC:  Diagnoses of Need for vaccination, Hypercholesterolemia, Acquired hypothyroidism, Prediabetes, and Thrombocytopenia (HCC) were pertinent to this visit.    HISTORY OF THE PRESENT ILLNESS: Patient is a 63 y.o. male. This pleasant patient is here today to establish care and discuss the following problems:    Problem   Prediabetes    Chronic history of prediabetes for which patient is diet controlled most recent A1c trend reveals the following:  Lab Results   Component Value Date/Time    HBA1C 6.0 (H) 08/23/2023 01:38 PM    HBA1C 6.1 (A) 08/16/2022 01:28 PM        Acquired Hypothyroidism    Chronic condition for which patient takes levothyroxine 100 mcg daily.  Most recent lab shows biochemical stability.     Hypercholesterolemia    Chronic condition currently stable patient not on a statin  Lab Results   Component Value Date/Time    CHOLSTRLTOT 156 08/23/2023 01:38 PM    CHOLSTRLTOT 199 08/19/2022 10:05 AM    LDL 98 08/23/2023 01:38 PM     (H) 08/19/2022 10:05 AM    HDL 48 08/23/2023 01:38 PM    HDL 41 08/19/2022 10:05 AM    TRIGLYCERIDE 50 08/23/2023 01:38 PM    TRIGLYCERIDE 98 08/19/2022 10:05 AM              Health Maintenance: Completed    ROS:   Review of Systems   Constitutional:  Negative for chills, fever, malaise/fatigue and weight loss.   Eyes:  Negative for blurred vision.   Respiratory:  Negative for cough and shortness of breath.    Cardiovascular:  Negative for chest pain.   Gastrointestinal:  Negative for abdominal pain, constipation, diarrhea, nausea and vomiting.   Musculoskeletal:  Negative for myalgias.   Neurological:  Negative for dizziness, weakness and headaches.         Objective:     Exam: /70 (BP Location: Left arm, Patient Position: Sitting, BP Cuff Size: Adult)   Pulse (!) 57   Temp 36.1 °C (96.9 °F) (Temporal)   Ht 1.727 m (5' 8\")   Wt 66.8 kg (147 lb 3.2 oz)   SpO2 97%  Body mass index is 22.38 kg/m².    Physical Exam  Constitutional:       " "Appearance: Normal appearance.   HENT:      Head: Normocephalic.   Eyes:      Conjunctiva/sclera: Conjunctivae normal.      Pupils: Pupils are equal, round, and reactive to light.   Cardiovascular:      Rate and Rhythm: Normal rate and regular rhythm.      Heart sounds: No murmur heard.  Pulmonary:      Effort: Pulmonary effort is normal. No respiratory distress.      Breath sounds: Normal breath sounds.   Musculoskeletal:         General: Normal range of motion.   Skin:     General: Skin is warm and dry.   Neurological:      General: No focal deficit present.      Mental Status: He is alert and oriented to person, place, and time.   Psychiatric:         Mood and Affect: Mood normal.         Behavior: Behavior normal.           Labs:   No visits with results within 1 Month(s) from this visit.   Latest known visit with results is:   Hospital Outpatient Visit on 10/09/2023   Component Date Value    TSH 10/09/2023 4.750      Lab Results   Component Value Date/Time    CHOLSTRLTOT 156 08/23/2023 01:38 PM    LDL 98 08/23/2023 01:38 PM    HDL 48 08/23/2023 01:38 PM    TRIGLYCERIDE 50 08/23/2023 01:38 PM       Lab Results   Component Value Date/Time    SODIUM 140 08/23/2023 01:38 PM    POTASSIUM 4.9 08/23/2023 01:38 PM    CHLORIDE 103 08/23/2023 01:38 PM    CO2 25 08/23/2023 01:38 PM    GLUCOSE 87 08/23/2023 01:38 PM    BUN 18 08/23/2023 01:38 PM    CREATININE 1.03 08/23/2023 01:38 PM     Lab Results   Component Value Date/Time    ALKPHOSPHAT 53 08/23/2023 01:38 PM    ASTSGOT 23 08/23/2023 01:38 PM    ALTSGPT 24 08/23/2023 01:38 PM    TBILIRUBIN 1.1 08/23/2023 01:38 PM      Lab Results   Component Value Date/Time    HBA1C 6.0 (H) 08/23/2023 01:38 PM    HBA1C 6.1 (A) 08/16/2022 01:28 PM    HBA1C 6.1 (H) 08/04/2021 10:21 AM    HBA1C 6.2 (H) 07/09/2019 09:00 AM    HBA1C 6.0 (H) 03/28/2019 09:29 AM     No results found for: \"TSH\"  Lab Results   Component Value Date/Time    FREET4 2.21 (H) 08/23/2023 01:38 PM     No results " "found for: \"CBC\"      Assessment & Plan: Medical Decision Making   63 y.o. male with the following -    Problem List Items Addressed This Visit       Acquired hypothyroidism     Chronic, controlled. On levothyroxine 100 mcg per day and tolerating it well. Labs reviewed and last TSH in October 2023 was bio chemically stable, therefore continue current medication regimen.  Patient reports good energy level on the medication. Patient denies insomnia, tremor or change in appetite.  Patient is taking the medication on an empty stomach in the morning and waiting at least 30 minutes before eating.  TSH with reflex T4 to be drawn at least annually.           Relevant Medications    levothyroxine (SYNTHROID) 100 MCG Tab    Other Relevant Orders    TSH WITH REFLEX TO FT4    Hypercholesterolemia     Chronic, controlled. The  last cholesterol panel in August 2023 was all within normal limits so we will continue the current lifestyle management  Recommend continuation of diligent efforts towards dietary improvements (Mediterranean Diet) and moderate intensity exercise at least 30 minutes per day most days of the week.  The 10-year ASCVD risk score (Tomas AGRAWAL, et al., 2019) is: 5.6%           Relevant Orders    Comp Metabolic Panel    Lipid Profile    Prediabetes     Chronic condition stable  -Exercise: At least 150 minutes of moderate aerobic activity per week or 75 minutes of vigorous aerobic activity per week, +2 days/week of strength training  - Healthy lifestyle and eating habits: Mediterranean-based diet (rich in fruits, vegetables, nuts and healthy oils), proper hydration and avoiding sugary beverages, adequate sleep hygiene-(allowing 7 to 8 hours of overnight sleep).           Relevant Orders    HEMOGLOBIN A1C    Comp Metabolic Panel     Other Visit Diagnoses       Need for vaccination        Relevant Orders    INFLUENZA VACCINE QUAD INJ (PF) (Completed)    Thrombocytopenia (HCC)        Relevant Orders    CBC WITH " DIFFERENTIAL            Differential diagnosis, natural history, supportive care, and indications for immediate follow-up discussed.  Shared decision making approach was utilized, and patient is amendable with plan of care.  Patient understands to return to clinic or go to the emergency department if symptoms worsen. All questions and concerns addressed to the best of my knowledge.      Return in about 7 months (around 8/3/2024).    Please note that this dictation was created using voice recognition software. I have made every reasonable attempt to correct obvious errors, but I expect that there are errors of grammar and possibly content that I did not discover before finalizing the note.

## 2024-02-16 DIAGNOSIS — E03.9 ACQUIRED HYPOTHYROIDISM: ICD-10-CM

## 2024-02-16 RX ORDER — LEVOTHYROXINE SODIUM 0.1 MG/1
100 TABLET ORAL
Qty: 90 TABLET | Refills: 3 | Status: SHIPPED | OUTPATIENT
Start: 2024-02-16

## 2024-02-16 NOTE — TELEPHONE ENCOUNTER
Received request via: Pharmacy    Was the patient seen in the last year in this department? Yes    Does the patient have an active prescription (recently filled or refills available) for medication(s) requested? No      Does the patient have residential Plus and need 100 day supply (blood pressure, diabetes and cholesterol meds only)? Patient does not have SCP

## 2024-02-26 ENCOUNTER — DOCUMENTATION (OUTPATIENT)
Dept: HEALTH INFORMATION MANAGEMENT | Facility: OTHER | Age: 64
End: 2024-02-26
Payer: COMMERCIAL

## 2024-08-01 ENCOUNTER — HOSPITAL ENCOUNTER (OUTPATIENT)
Dept: LAB | Facility: MEDICAL CENTER | Age: 64
End: 2024-08-01
Payer: COMMERCIAL

## 2024-08-01 DIAGNOSIS — R73.03 PREDIABETES: ICD-10-CM

## 2024-08-01 DIAGNOSIS — E78.00 HYPERCHOLESTEROLEMIA: ICD-10-CM

## 2024-08-01 DIAGNOSIS — E03.9 ACQUIRED HYPOTHYROIDISM: ICD-10-CM

## 2024-08-01 DIAGNOSIS — D69.6 THROMBOCYTOPENIA (HCC): ICD-10-CM

## 2024-08-01 LAB
ALBUMIN SERPL BCP-MCNC: 4.2 G/DL (ref 3.2–4.9)
ALBUMIN/GLOB SERPL: 1.7 G/DL
ALP SERPL-CCNC: 43 U/L (ref 30–99)
ALT SERPL-CCNC: 37 U/L (ref 2–50)
ANION GAP SERPL CALC-SCNC: 10 MMOL/L (ref 7–16)
AST SERPL-CCNC: 27 U/L (ref 12–45)
BASOPHILS # BLD AUTO: 0.6 % (ref 0–1.8)
BASOPHILS # BLD: 0.02 K/UL (ref 0–0.12)
BILIRUB SERPL-MCNC: 1.1 MG/DL (ref 0.1–1.5)
BUN SERPL-MCNC: 26 MG/DL (ref 8–22)
CALCIUM ALBUM COR SERPL-MCNC: 8.8 MG/DL (ref 8.5–10.5)
CALCIUM SERPL-MCNC: 9 MG/DL (ref 8.5–10.5)
CHLORIDE SERPL-SCNC: 104 MMOL/L (ref 96–112)
CHOLEST SERPL-MCNC: 192 MG/DL (ref 100–199)
CO2 SERPL-SCNC: 25 MMOL/L (ref 20–33)
CREAT SERPL-MCNC: 1.14 MG/DL (ref 0.5–1.4)
EOSINOPHIL # BLD AUTO: 0.08 K/UL (ref 0–0.51)
EOSINOPHIL NFR BLD: 2.3 % (ref 0–6.9)
ERYTHROCYTE [DISTWIDTH] IN BLOOD BY AUTOMATED COUNT: 50.4 FL (ref 35.9–50)
EST. AVERAGE GLUCOSE BLD GHB EST-MCNC: 123 MG/DL
GFR SERPLBLD CREATININE-BSD FMLA CKD-EPI: 72 ML/MIN/1.73 M 2
GLOBULIN SER CALC-MCNC: 2.5 G/DL (ref 1.9–3.5)
GLUCOSE SERPL-MCNC: 95 MG/DL (ref 65–99)
HBA1C MFR BLD: 5.9 % (ref 4–5.6)
HCT VFR BLD AUTO: 46 % (ref 42–52)
HDLC SERPL-MCNC: 54 MG/DL
HGB BLD-MCNC: 15 G/DL (ref 14–18)
IMM GRANULOCYTES # BLD AUTO: 0 K/UL (ref 0–0.11)
IMM GRANULOCYTES NFR BLD AUTO: 0 % (ref 0–0.9)
LDLC SERPL CALC-MCNC: 123 MG/DL
LYMPHOCYTES # BLD AUTO: 1.78 K/UL (ref 1–4.8)
LYMPHOCYTES NFR BLD: 50.9 % (ref 22–41)
MCH RBC QN AUTO: 31.5 PG (ref 27–33)
MCHC RBC AUTO-ENTMCNC: 32.6 G/DL (ref 32.3–36.5)
MCV RBC AUTO: 96.6 FL (ref 81.4–97.8)
MONOCYTES # BLD AUTO: 0.21 K/UL (ref 0–0.85)
MONOCYTES NFR BLD AUTO: 6 % (ref 0–13.4)
NEUTROPHILS # BLD AUTO: 1.41 K/UL (ref 1.82–7.42)
NEUTROPHILS NFR BLD: 40.2 % (ref 44–72)
NRBC # BLD AUTO: 0 K/UL
NRBC BLD-RTO: 0 /100 WBC (ref 0–0.2)
PLATELET # BLD AUTO: 168 K/UL (ref 164–446)
PMV BLD AUTO: 11 FL (ref 9–12.9)
POTASSIUM SERPL-SCNC: 4.1 MMOL/L (ref 3.6–5.5)
PROT SERPL-MCNC: 6.7 G/DL (ref 6–8.2)
RBC # BLD AUTO: 4.76 M/UL (ref 4.7–6.1)
SODIUM SERPL-SCNC: 139 MMOL/L (ref 135–145)
TRIGL SERPL-MCNC: 74 MG/DL (ref 0–149)
TSH SERPL DL<=0.005 MIU/L-ACNC: 2.42 UIU/ML (ref 0.38–5.33)
WBC # BLD AUTO: 3.5 K/UL (ref 4.8–10.8)

## 2024-08-01 PROCEDURE — 80061 LIPID PANEL: CPT

## 2024-08-01 PROCEDURE — 36415 COLL VENOUS BLD VENIPUNCTURE: CPT

## 2024-08-01 PROCEDURE — 84443 ASSAY THYROID STIM HORMONE: CPT

## 2024-08-01 PROCEDURE — 85025 COMPLETE CBC W/AUTO DIFF WBC: CPT

## 2024-08-01 PROCEDURE — 80053 COMPREHEN METABOLIC PANEL: CPT

## 2024-08-01 PROCEDURE — 83036 HEMOGLOBIN GLYCOSYLATED A1C: CPT

## 2024-10-02 ENCOUNTER — HOSPITAL ENCOUNTER (OUTPATIENT)
Dept: LAB | Facility: MEDICAL CENTER | Age: 64
End: 2024-10-02
Payer: COMMERCIAL

## 2024-10-02 ENCOUNTER — HOSPITAL ENCOUNTER (OUTPATIENT)
Dept: RADIOLOGY | Facility: MEDICAL CENTER | Age: 64
End: 2024-10-02
Payer: COMMERCIAL

## 2024-10-02 ENCOUNTER — OFFICE VISIT (OUTPATIENT)
Dept: MEDICAL GROUP | Facility: PHYSICIAN GROUP | Age: 64
End: 2024-10-02
Payer: COMMERCIAL

## 2024-10-02 VITALS
SYSTOLIC BLOOD PRESSURE: 110 MMHG | HEIGHT: 68 IN | TEMPERATURE: 98.1 F | DIASTOLIC BLOOD PRESSURE: 80 MMHG | OXYGEN SATURATION: 94 % | WEIGHT: 148.6 LBS | BODY MASS INDEX: 22.52 KG/M2 | HEART RATE: 70 BPM

## 2024-10-02 DIAGNOSIS — N52.9 ERECTILE DYSFUNCTION, UNSPECIFIED ERECTILE DYSFUNCTION TYPE: ICD-10-CM

## 2024-10-02 DIAGNOSIS — M54.12 CERVICAL RADICULAR PAIN: ICD-10-CM

## 2024-10-02 DIAGNOSIS — R73.03 PREDIABETES: ICD-10-CM

## 2024-10-02 DIAGNOSIS — Z23 NEED FOR VACCINATION: ICD-10-CM

## 2024-10-02 DIAGNOSIS — E03.9 ACQUIRED HYPOTHYROIDISM: ICD-10-CM

## 2024-10-02 DIAGNOSIS — Z00.00 HEALTHCARE MAINTENANCE: ICD-10-CM

## 2024-10-02 DIAGNOSIS — E78.00 HYPERCHOLESTEROLEMIA: ICD-10-CM

## 2024-10-02 PROCEDURE — 84403 ASSAY OF TOTAL TESTOSTERONE: CPT

## 2024-10-02 PROCEDURE — 3074F SYST BP LT 130 MM HG: CPT

## 2024-10-02 PROCEDURE — 90471 IMMUNIZATION ADMIN: CPT

## 2024-10-02 PROCEDURE — 84402 ASSAY OF FREE TESTOSTERONE: CPT

## 2024-10-02 PROCEDURE — 36415 COLL VENOUS BLD VENIPUNCTURE: CPT

## 2024-10-02 PROCEDURE — 90656 IIV3 VACC NO PRSV 0.5 ML IM: CPT

## 2024-10-02 PROCEDURE — 3079F DIAST BP 80-89 MM HG: CPT

## 2024-10-02 PROCEDURE — 84270 ASSAY OF SEX HORMONE GLOBUL: CPT

## 2024-10-02 PROCEDURE — 72040 X-RAY EXAM NECK SPINE 2-3 VW: CPT

## 2024-10-02 PROCEDURE — 99214 OFFICE O/P EST MOD 30 MIN: CPT | Mod: 25

## 2024-10-02 RX ORDER — SILDENAFIL 100 MG/1
100 TABLET, FILM COATED ORAL
Qty: 10 TABLET | Refills: 3 | Status: SHIPPED | OUTPATIENT
Start: 2024-10-02

## 2024-10-02 SDOH — HEALTH STABILITY: PHYSICAL HEALTH: ON AVERAGE, HOW MANY MINUTES DO YOU ENGAGE IN EXERCISE AT THIS LEVEL?: 10 MIN

## 2024-10-02 SDOH — HEALTH STABILITY: PHYSICAL HEALTH: ON AVERAGE, HOW MANY DAYS PER WEEK DO YOU ENGAGE IN MODERATE TO STRENUOUS EXERCISE (LIKE A BRISK WALK)?: 3 DAYS

## 2024-10-02 SDOH — ECONOMIC STABILITY: INCOME INSECURITY: IN THE LAST 12 MONTHS, WAS THERE A TIME WHEN YOU WERE NOT ABLE TO PAY THE MORTGAGE OR RENT ON TIME?: NO

## 2024-10-02 SDOH — ECONOMIC STABILITY: FOOD INSECURITY: WITHIN THE PAST 12 MONTHS, YOU WORRIED THAT YOUR FOOD WOULD RUN OUT BEFORE YOU GOT MONEY TO BUY MORE.: NEVER TRUE

## 2024-10-02 SDOH — ECONOMIC STABILITY: FOOD INSECURITY: WITHIN THE PAST 12 MONTHS, THE FOOD YOU BOUGHT JUST DIDN'T LAST AND YOU DIDN'T HAVE MONEY TO GET MORE.: NEVER TRUE

## 2024-10-02 SDOH — HEALTH STABILITY: MENTAL HEALTH
STRESS IS WHEN SOMEONE FEELS TENSE, NERVOUS, ANXIOUS, OR CAN'T SLEEP AT NIGHT BECAUSE THEIR MIND IS TROUBLED. HOW STRESSED ARE YOU?: NOT AT ALL

## 2024-10-02 SDOH — ECONOMIC STABILITY: INCOME INSECURITY: HOW HARD IS IT FOR YOU TO PAY FOR THE VERY BASICS LIKE FOOD, HOUSING, MEDICAL CARE, AND HEATING?: NOT HARD AT ALL

## 2024-10-02 ASSESSMENT — SOCIAL DETERMINANTS OF HEALTH (SDOH)
HOW OFTEN DO YOU HAVE SIX OR MORE DRINKS ON ONE OCCASION: NEVER
WITHIN THE PAST 12 MONTHS, YOU WORRIED THAT YOUR FOOD WOULD RUN OUT BEFORE YOU GOT THE MONEY TO BUY MORE: NEVER TRUE
HOW OFTEN DO YOU ATTEND CHURCH OR RELIGIOUS SERVICES?: NEVER
HOW OFTEN DO YOU GET TOGETHER WITH FRIENDS OR RELATIVES?: NEVER
HOW OFTEN DO YOU ATTENT MEETINGS OF THE CLUB OR ORGANIZATION YOU BELONG TO?: NEVER
IN THE PAST 12 MONTHS, HAS THE ELECTRIC, GAS, OIL, OR WATER COMPANY THREATENED TO SHUT OFF SERVICE IN YOUR HOME?: NO
HOW HARD IS IT FOR YOU TO PAY FOR THE VERY BASICS LIKE FOOD, HOUSING, MEDICAL CARE, AND HEATING?: NOT HARD AT ALL
HOW OFTEN DO YOU GET TOGETHER WITH FRIENDS OR RELATIVES?: NEVER
IN A TYPICAL WEEK, HOW MANY TIMES DO YOU TALK ON THE PHONE WITH FAMILY, FRIENDS, OR NEIGHBORS?: NEVER
HOW OFTEN DO YOU ATTENT MEETINGS OF THE CLUB OR ORGANIZATION YOU BELONG TO?: NEVER
IN A TYPICAL WEEK, HOW MANY TIMES DO YOU TALK ON THE PHONE WITH FAMILY, FRIENDS, OR NEIGHBORS?: NEVER
HOW MANY DRINKS CONTAINING ALCOHOL DO YOU HAVE ON A TYPICAL DAY WHEN YOU ARE DRINKING: PATIENT DOES NOT DRINK
DO YOU BELONG TO ANY CLUBS OR ORGANIZATIONS SUCH AS CHURCH GROUPS UNIONS, FRATERNAL OR ATHLETIC GROUPS, OR SCHOOL GROUPS?: NO
HOW OFTEN DO YOU HAVE A DRINK CONTAINING ALCOHOL: NEVER
DO YOU BELONG TO ANY CLUBS OR ORGANIZATIONS SUCH AS CHURCH GROUPS UNIONS, FRATERNAL OR ATHLETIC GROUPS, OR SCHOOL GROUPS?: NO
HOW OFTEN DO YOU ATTEND CHURCH OR RELIGIOUS SERVICES?: NEVER

## 2024-10-02 ASSESSMENT — LIFESTYLE VARIABLES
AUDIT-C TOTAL SCORE: 0
HOW MANY STANDARD DRINKS CONTAINING ALCOHOL DO YOU HAVE ON A TYPICAL DAY: PATIENT DOES NOT DRINK
SKIP TO QUESTIONS 9-10: 1
HOW OFTEN DO YOU HAVE SIX OR MORE DRINKS ON ONE OCCASION: NEVER
HOW OFTEN DO YOU HAVE A DRINK CONTAINING ALCOHOL: NEVER

## 2024-10-02 ASSESSMENT — ENCOUNTER SYMPTOMS
NECK PAIN: 1
DIZZINESS: 0
VOMITING: 0
FEVER: 0
DIARRHEA: 0
WEAKNESS: 0
COUGH: 0
SHORTNESS OF BREATH: 0
CHILLS: 0
NAUSEA: 0
WEIGHT LOSS: 0
CONSTIPATION: 0
HEADACHES: 0
ABDOMINAL PAIN: 0
BLURRED VISION: 0
MYALGIAS: 0
TINGLING: 1

## 2024-10-02 ASSESSMENT — FIBROSIS 4 INDEX: FIB4 SCORE: 1.66

## 2024-10-03 DIAGNOSIS — M54.12 CERVICAL RADICULAR PAIN: ICD-10-CM

## 2024-10-03 DIAGNOSIS — M43.02 CERVICAL SPONDYLOLYSIS: ICD-10-CM

## 2024-10-05 LAB
SHBG SERPL-SCNC: 31 NMOL/L (ref 19–76)
TESTOST FREE MFR SERPL: 1.8 % (ref 1.6–2.9)
TESTOST FREE SERPL-MCNC: 29 PG/ML (ref 47–244)
TESTOST SERPL-MCNC: 165 NG/DL (ref 300–720)

## 2024-10-07 DIAGNOSIS — R79.89 LOW TESTOSTERONE IN MALE: ICD-10-CM

## 2024-10-07 DIAGNOSIS — E29.1 HYPOGONADISM MALE: ICD-10-CM

## 2024-11-04 ENCOUNTER — OFFICE VISIT (OUTPATIENT)
Dept: UROLOGY | Facility: MEDICAL CENTER | Age: 64
End: 2024-11-04
Payer: COMMERCIAL

## 2024-11-04 DIAGNOSIS — E29.1 HYPOGONADISM IN MALE: ICD-10-CM

## 2024-11-04 PROCEDURE — 99204 OFFICE O/P NEW MOD 45 MIN: CPT | Performed by: STUDENT IN AN ORGANIZED HEALTH CARE EDUCATION/TRAINING PROGRAM

## 2024-11-04 NOTE — PROGRESS NOTES
Subjective  Lon Swanson is a 64 y.o. male who presents today for evaluation of low testosterone.    Patient states his main reason for seeking evaluation was for erectile function.  He also notes general poor energy levels, difficulty building and maintaining muscle mass.  He has never been on testosterone replacement therapy in the past. Prior testosterone value was 165 ng/dL 10/2/24.    Family History   Problem Relation Age of Onset    Hypertension Mother     Lung Disease Father     Cancer Father         lung    Heart Disease Sister        Social History     Socioeconomic History    Marital status:      Spouse name: Not on file    Number of children: Not on file    Years of education: Not on file    Highest education level: Bachelor's degree (e.g., BA, AB, BS)   Occupational History    Not on file   Tobacco Use    Smoking status: Former     Current packs/day: 0.00     Types: Cigarettes     Quit date:      Years since quittin.8    Smokeless tobacco: Never   Vaping Use    Vaping status: Never Used   Substance and Sexual Activity    Alcohol use: Not Currently    Drug use: Not Currently    Sexual activity: Yes     Partners: Female     Birth control/protection: None   Other Topics Concern    Not on file   Social History Narrative    Not on file     Social Drivers of Health     Financial Resource Strain: Low Risk  (10/2/2024)    Overall Financial Resource Strain (CARDIA)     Difficulty of Paying Living Expenses: Not hard at all   Food Insecurity: No Food Insecurity (10/2/2024)    Hunger Vital Sign     Worried About Running Out of Food in the Last Year: Never true     Ran Out of Food in the Last Year: Never true   Transportation Needs: No Transportation Needs (10/2/2024)    PRAPARE - Transportation     Lack of Transportation (Medical): No     Lack of Transportation (Non-Medical): No   Physical Activity: Insufficiently Active (10/2/2024)    Exercise Vital Sign     Days of Exercise per Week: 3 days      Minutes of Exercise per Session: 10 min   Stress: No Stress Concern Present (10/2/2024)    [employer] [employer] - Occupational Stress Questionnaire     Feeling of Stress : Not at all   Social Connections: Socially Isolated (10/2/2024)    Social Connection and Isolation Panel [NHANES]     Frequency of Communication with Friends and Family: Never     Frequency of Social Gatherings with Friends and Family: Never     Attends Voodoo Services: Never     Active Member of Clubs or Organizations: No     Attends Club or Organization Meetings: Never     Marital Status:    Intimate Partner Violence: Not on file   Housing Stability: Unknown (10/2/2024)    Housing Stability Vital Sign     Unable to Pay for Housing in the Last Year: No     Number of Times Moved in the Last Year: Not on file     Homeless in the Last Year: No       Past Surgical History:   Procedure Laterality Date    LUMBAR LAMINECTOMY DISKECTOMY  2014       Past Medical History:   Diagnosis Date    Degenerative disc disease, lumbar     Hyperlipidemia     Thyroid disease        Current Outpatient Medications   Medication Sig Dispense Refill    sildenafil citrate (VIAGRA) 100 MG tablet Take 1 Tablet by mouth 1 time a day as needed for Erectile Dysfunction. 10 Tablet 3    levothyroxine (SYNTHROID) 100 MCG Tab TAKE 1 TABLET BY MOUTH EVERY DAY IN THE MORNING ON AN EMPTY STOMACH 90 Tablet 3    Multiple Vitamin (MULTIVITAMIN ADULT PO) Take 1 Tablet by mouth every day.      Omega-3 Fatty Acids (FISH OIL) 1200 MG Cap Take 3,000 mg by mouth every day.       No current facility-administered medications for this visit.       Allergies   Allergen Reactions    Methimazole       Create Date: [id number] Create User Name: Javid Silva Update Date: [id number] Update User Name: Javid Silva    Propylthiouracil      Other reaction(s): Rash, urticarial   Create Date: [id number] Create User Name: Javid Silva Update Date: [id number] Update User  Name: Javid Silva       Objective  There were no vitals taken for this visit.  Physical Exam  Constitutional:       Appearance: Normal appearance.   HENT:      Head: Normocephalic and atraumatic.   Eyes:      Extraocular Movements: Extraocular movements intact.      Conjunctiva/sclera: Conjunctivae normal.   Pulmonary:      Effort: No respiratory distress.   Musculoskeletal:      Cervical back: Normal range of motion.   Skin:     General: Skin is warm and dry.   Neurological:      General: No focal deficit present.      Mental Status: He is alert.   Psychiatric:         Mood and Affect: Mood normal.         Labs:   INFERTILITY   Lab Results   Component Value Date/Time    TESTOSTERONE 165 (L) 10/02/2024 1220    FREETESTOST 29 (L) 10/02/2024 1220    SEXHORM 31 10/02/2024 1220       Imaging:     none    Assessment    Hypogonadism  I reviewed the common causes of hypogonadism. We will obtain an early morning serum LH, FSH, estradiol, and testosterone. We discussed the need for a repeat testosterone plus prolactin, CBC, and PSA (in patient’s >40 years of age) if the initial testosterone level is low in preparation for starting testosterone replacement therapy. Low testosterone is defined as two early morning fasting measurements <300 ng/dL.      We discussed various formulations of TRT, including topical gel/cream/patch, intranasal, IM injectable, and long-acting depo or pellets. We discussed the off-label option of oral clomiphene citrate for patient’s wishing to preserve future fertility. We discussed side effects, risks and benefits of each. Our goal with therapy is to achieve a testosterone level of 400-600 ng/dL.      Testosterone replacement can result in improvements in erectile function, low libido, anemia, bone mineral density, muscle mass, and/or depressive symptoms. Data is inconclusive on whether testosterone replacement improves cognitive function, energy, fatigue, and measures of diabetes.     We reviewed the  common risks following testosterone replacement, including cholesterol imbalance, polycythemia, and infertility. There is no evidence supporting that testosterone replacement leads to the development of prostate cancer or DVT/PE or increases the risk of cardiovascular events (MI, stroke, etc), however, low testosterone is a risk factor for cardiovascular disease.       He understands these risks and wishes to proceed if levels are confirmed to be low.  He understands he is to adhere to a strict follow-up for proper monitoring. Based on the the above, he wishes to start treatment with gel testosterone replacement if his levels are confirmed to be low.      Plan    Problem List Items Addressed This Visit    None  Visit Diagnoses       Hypogonadism in male        Relevant Orders    Comp Metabolic Panel    CBC WITHOUT DIFFERENTIAL    PROLACTIN    FSH/LH    ESTRADIOL    TESTOSTERONE SERUM    PROSTATE SPECIFIC AG SCREENING          Will contact with results of hormone panel

## 2024-11-05 ENCOUNTER — HOSPITAL ENCOUNTER (OUTPATIENT)
Dept: LAB | Facility: MEDICAL CENTER | Age: 64
End: 2024-11-05
Attending: STUDENT IN AN ORGANIZED HEALTH CARE EDUCATION/TRAINING PROGRAM
Payer: COMMERCIAL

## 2024-11-05 DIAGNOSIS — E29.1 HYPOGONADISM IN MALE: ICD-10-CM

## 2024-11-05 LAB
ERYTHROCYTE [DISTWIDTH] IN BLOOD BY AUTOMATED COUNT: 48 FL (ref 35.9–50)
HCT VFR BLD AUTO: 44.6 % (ref 42–52)
HGB BLD-MCNC: 14.9 G/DL (ref 14–18)
MCH RBC QN AUTO: 32.1 PG (ref 27–33)
MCHC RBC AUTO-ENTMCNC: 33.4 G/DL (ref 32.3–36.5)
MCV RBC AUTO: 96.1 FL (ref 81.4–97.8)
PLATELET # BLD AUTO: 175 K/UL (ref 164–446)
PMV BLD AUTO: 10.4 FL (ref 9–12.9)
RBC # BLD AUTO: 4.64 M/UL (ref 4.7–6.1)
WBC # BLD AUTO: 4.1 K/UL (ref 4.8–10.8)

## 2024-11-05 PROCEDURE — 83001 ASSAY OF GONADOTROPIN (FSH): CPT

## 2024-11-05 PROCEDURE — 84146 ASSAY OF PROLACTIN: CPT

## 2024-11-05 PROCEDURE — 84403 ASSAY OF TOTAL TESTOSTERONE: CPT

## 2024-11-05 PROCEDURE — 84153 ASSAY OF PSA TOTAL: CPT

## 2024-11-05 PROCEDURE — 82670 ASSAY OF TOTAL ESTRADIOL: CPT

## 2024-11-05 PROCEDURE — 80053 COMPREHEN METABOLIC PANEL: CPT

## 2024-11-05 PROCEDURE — 36415 COLL VENOUS BLD VENIPUNCTURE: CPT

## 2024-11-05 PROCEDURE — 83002 ASSAY OF GONADOTROPIN (LH): CPT

## 2024-11-05 PROCEDURE — 85027 COMPLETE CBC AUTOMATED: CPT

## 2024-11-06 LAB
ALBUMIN SERPL BCP-MCNC: 4.2 G/DL (ref 3.2–4.9)
ALBUMIN/GLOB SERPL: 1.6 G/DL
ALP SERPL-CCNC: 43 U/L (ref 30–99)
ALT SERPL-CCNC: 21 U/L (ref 2–50)
ANION GAP SERPL CALC-SCNC: 11 MMOL/L (ref 7–16)
AST SERPL-CCNC: 27 U/L (ref 12–45)
BILIRUB SERPL-MCNC: 0.8 MG/DL (ref 0.1–1.5)
BUN SERPL-MCNC: 16 MG/DL (ref 8–22)
CALCIUM ALBUM COR SERPL-MCNC: 9.6 MG/DL (ref 8.5–10.5)
CALCIUM SERPL-MCNC: 9.8 MG/DL (ref 8.5–10.5)
CHLORIDE SERPL-SCNC: 105 MMOL/L (ref 96–112)
CO2 SERPL-SCNC: 27 MMOL/L (ref 20–33)
CREAT SERPL-MCNC: 1.09 MG/DL (ref 0.5–1.4)
ESTRADIOL SERPL-MCNC: 15.5 PG/ML
FSH SERPL-ACNC: 4.6 MIU/ML (ref 1.5–12.4)
GFR SERPLBLD CREATININE-BSD FMLA CKD-EPI: 76 ML/MIN/1.73 M 2
GLOBULIN SER CALC-MCNC: 2.6 G/DL (ref 1.9–3.5)
GLUCOSE SERPL-MCNC: 130 MG/DL (ref 65–99)
LH SERPL-ACNC: 3 IU/L (ref 1.7–8.6)
POTASSIUM SERPL-SCNC: 4.1 MMOL/L (ref 3.6–5.5)
PROLACTIN SERPL-MCNC: 11.9 NG/ML (ref 2.1–17.7)
PROT SERPL-MCNC: 6.8 G/DL (ref 6–8.2)
PSA SERPL-MCNC: 0.48 NG/ML (ref 0–4)
SODIUM SERPL-SCNC: 143 MMOL/L (ref 135–145)
TESTOST SERPL-MCNC: 256 NG/DL (ref 175–781)

## 2024-11-07 ENCOUNTER — TELEPHONE (OUTPATIENT)
Dept: UROLOGY | Facility: MEDICAL CENTER | Age: 64
End: 2024-11-07
Payer: COMMERCIAL

## 2024-11-07 DIAGNOSIS — E29.1 HYPOGONADISM IN MALE: ICD-10-CM

## 2024-11-07 RX ORDER — TESTOSTERONE 1.62 MG/G
GEL TRANSDERMAL
Qty: 150 G | Refills: 2 | Status: SHIPPED | OUTPATIENT
Start: 2024-11-07 | End: 2024-11-12 | Stop reason: SDUPTHER

## 2024-11-11 ENCOUNTER — TELEPHONE (OUTPATIENT)
Dept: UROLOGY | Facility: MEDICAL CENTER | Age: 64
End: 2024-11-11
Payer: COMMERCIAL

## 2024-11-12 ENCOUNTER — TELEPHONE (OUTPATIENT)
Dept: UROLOGY | Facility: MEDICAL CENTER | Age: 64
End: 2024-11-12
Payer: COMMERCIAL

## 2024-11-12 DIAGNOSIS — E29.1 HYPOGONADISM IN MALE: ICD-10-CM

## 2024-11-12 RX ORDER — TESTOSTERONE 1.62 MG/G
GEL TRANSDERMAL
Qty: 75 G | Refills: 2 | Status: SHIPPED | OUTPATIENT
Start: 2024-11-12 | End: 2025-02-22

## 2025-01-28 ENCOUNTER — PATIENT MESSAGE (OUTPATIENT)
Dept: UROLOGY | Facility: MEDICAL CENTER | Age: 65
End: 2025-01-28
Payer: COMMERCIAL

## 2025-01-28 DIAGNOSIS — E03.9 ACQUIRED HYPOTHYROIDISM: ICD-10-CM

## 2025-01-28 DIAGNOSIS — E29.1 HYPOGONADISM IN MALE: ICD-10-CM

## 2025-01-29 RX ORDER — TESTOSTERONE 1.62 MG/G
GEL TRANSDERMAL
Qty: 150 G | Refills: 2 | Status: SHIPPED | OUTPATIENT
Start: 2025-01-29 | End: 2025-05-11

## 2025-01-29 RX ORDER — LEVOTHYROXINE SODIUM 100 UG/1
100 TABLET ORAL
Qty: 90 TABLET | Refills: 3 | Status: SHIPPED | OUTPATIENT
Start: 2025-01-29

## 2025-01-29 NOTE — TELEPHONE ENCOUNTER
Received request via: Patient    Was the patient seen in the last year in this department? Yes    Does the patient have an active prescription (recently filled or refills available) for medication(s) requested? No    Pharmacy Name: [id number] - [place] - [id number]      Does the patient have alf Plus and need 100-day supply? (This applies to ALL medications) Patient does not have SCP

## 2025-02-11 ENCOUNTER — PATIENT MESSAGE (OUTPATIENT)
Dept: UROLOGY | Facility: MEDICAL CENTER | Age: 65
End: 2025-02-11
Payer: COMMERCIAL

## 2025-02-13 ENCOUNTER — PATIENT MESSAGE (OUTPATIENT)
Dept: UROLOGY | Facility: MEDICAL CENTER | Age: 65
End: 2025-02-13
Payer: COMMERCIAL

## 2025-05-15 DIAGNOSIS — E29.1 HYPOGONADISM IN MALE: ICD-10-CM

## 2025-05-15 RX ORDER — TESTOSTERONE 1.62 MG/G
GEL TRANSDERMAL
Qty: 150 G | Refills: 2 | OUTPATIENT
Start: 2025-05-15

## 2025-05-15 RX ORDER — TESTOSTERONE 1.62 MG/G
GEL TRANSDERMAL
Qty: 150 G | Refills: 0 | Status: SHIPPED | OUTPATIENT
Start: 2025-05-15 | End: 2025-06-23

## 2025-05-16 ENCOUNTER — APPOINTMENT (OUTPATIENT)
Dept: LAB | Facility: MEDICAL CENTER | Age: 65
End: 2025-05-16
Payer: COMMERCIAL

## 2025-05-21 ENCOUNTER — HOSPITAL ENCOUNTER (OUTPATIENT)
Dept: LAB | Facility: MEDICAL CENTER | Age: 65
End: 2025-05-21
Attending: STUDENT IN AN ORGANIZED HEALTH CARE EDUCATION/TRAINING PROGRAM
Payer: COMMERCIAL

## 2025-05-21 DIAGNOSIS — E29.1 HYPOGONADISM IN MALE: ICD-10-CM

## 2025-05-21 LAB
ERYTHROCYTE [DISTWIDTH] IN BLOOD BY AUTOMATED COUNT: 48.7 FL (ref 35.9–50)
HCT VFR BLD AUTO: 48 % (ref 42–52)
HGB BLD-MCNC: 15.6 G/DL (ref 14–18)
MCH RBC QN AUTO: 31.1 PG (ref 27–33)
MCHC RBC AUTO-ENTMCNC: 32.5 G/DL (ref 32.3–36.5)
MCV RBC AUTO: 95.6 FL (ref 81.4–97.8)
PLATELET # BLD AUTO: 158 K/UL (ref 164–446)
PMV BLD AUTO: 10.3 FL (ref 9–12.9)
RBC # BLD AUTO: 5.02 M/UL (ref 4.7–6.1)
WBC # BLD AUTO: 3.8 K/UL (ref 4.8–10.8)

## 2025-05-21 PROCEDURE — 36415 COLL VENOUS BLD VENIPUNCTURE: CPT

## 2025-05-21 PROCEDURE — 80053 COMPREHEN METABOLIC PANEL: CPT

## 2025-05-21 PROCEDURE — 85027 COMPLETE CBC AUTOMATED: CPT

## 2025-05-21 PROCEDURE — 84403 ASSAY OF TOTAL TESTOSTERONE: CPT

## 2025-05-21 PROCEDURE — 82670 ASSAY OF TOTAL ESTRADIOL: CPT

## 2025-05-22 LAB
ALBUMIN SERPL BCP-MCNC: 4.1 G/DL (ref 3.2–4.9)
ALBUMIN/GLOB SERPL: 1.5 G/DL
ALP SERPL-CCNC: 46 U/L (ref 30–99)
ALT SERPL-CCNC: 25 U/L (ref 2–50)
ANION GAP SERPL CALC-SCNC: 10 MMOL/L (ref 7–16)
AST SERPL-CCNC: 30 U/L (ref 12–45)
BILIRUB SERPL-MCNC: 1.1 MG/DL (ref 0.1–1.5)
BUN SERPL-MCNC: 23 MG/DL (ref 8–22)
CALCIUM ALBUM COR SERPL-MCNC: 9.1 MG/DL (ref 8.5–10.5)
CALCIUM SERPL-MCNC: 9.2 MG/DL (ref 8.5–10.5)
CHLORIDE SERPL-SCNC: 107 MMOL/L (ref 96–112)
CO2 SERPL-SCNC: 25 MMOL/L (ref 20–33)
CREAT SERPL-MCNC: 1.1 MG/DL (ref 0.5–1.4)
GFR SERPLBLD CREATININE-BSD FMLA CKD-EPI: 75 ML/MIN/1.73 M 2
GLOBULIN SER CALC-MCNC: 2.7 G/DL (ref 1.9–3.5)
GLUCOSE SERPL-MCNC: 98 MG/DL (ref 65–99)
POTASSIUM SERPL-SCNC: 4.6 MMOL/L (ref 3.6–5.5)
PROT SERPL-MCNC: 6.8 G/DL (ref 6–8.2)
SODIUM SERPL-SCNC: 142 MMOL/L (ref 135–145)
TESTOST SERPL-MCNC: 268 NG/DL (ref 175–781)

## 2025-05-23 ENCOUNTER — PATIENT MESSAGE (OUTPATIENT)
Dept: UROLOGY | Facility: MEDICAL CENTER | Age: 65
End: 2025-05-23
Payer: COMMERCIAL

## 2025-05-23 LAB — ESTRADIOL SERPL-MCNC: 31 PG/ML

## 2025-06-04 ENCOUNTER — PATIENT MESSAGE (OUTPATIENT)
Dept: UROLOGY | Facility: MEDICAL CENTER | Age: 65
End: 2025-06-04
Payer: COMMERCIAL

## 2025-06-04 DIAGNOSIS — E29.1 HYPOGONADISM IN MALE: ICD-10-CM

## 2025-06-04 RX ORDER — TESTOSTERONE 1.62 MG/G
GEL TRANSDERMAL
Qty: 150 G | Refills: 2 | Status: SHIPPED | OUTPATIENT
Start: 2025-06-04 | End: 2025-07-13

## 2025-06-12 ENCOUNTER — OFFICE VISIT (OUTPATIENT)
Dept: MEDICAL GROUP | Facility: PHYSICIAN GROUP | Age: 65
End: 2025-06-12
Payer: COMMERCIAL

## 2025-06-12 VITALS
TEMPERATURE: 97.6 F | WEIGHT: 151.8 LBS | SYSTOLIC BLOOD PRESSURE: 90 MMHG | BODY MASS INDEX: 23.01 KG/M2 | HEART RATE: 75 BPM | OXYGEN SATURATION: 96 % | HEIGHT: 68 IN | DIASTOLIC BLOOD PRESSURE: 70 MMHG

## 2025-06-12 DIAGNOSIS — R73.03 PREDIABETES: ICD-10-CM

## 2025-06-12 DIAGNOSIS — Z23 NEED FOR VACCINATION: ICD-10-CM

## 2025-06-12 DIAGNOSIS — E03.9 ACQUIRED HYPOTHYROIDISM: ICD-10-CM

## 2025-06-12 DIAGNOSIS — E78.00 HYPERCHOLESTEROLEMIA: Primary | ICD-10-CM

## 2025-06-12 DIAGNOSIS — E29.1 HYPOGONADISM IN MALE: ICD-10-CM

## 2025-06-12 PROBLEM — M48.02 CERVICAL SPINAL STENOSIS: Status: ACTIVE | Noted: 2018-11-01

## 2025-06-12 PROBLEM — N40.1 BENIGN PROSTATIC HYPERPLASIA WITH URINARY OBSTRUCTION: Status: ACTIVE | Noted: 2018-03-22

## 2025-06-12 PROBLEM — M16.0 PRIMARY OSTEOARTHRITIS OF BOTH HIPS: Status: ACTIVE | Noted: 2019-12-10

## 2025-06-12 PROBLEM — N13.8 BENIGN PROSTATIC HYPERPLASIA WITH URINARY OBSTRUCTION: Status: ACTIVE | Noted: 2018-03-22

## 2025-06-12 PROBLEM — M51.369 DEGENERATIVE DISC DISEASE, LUMBAR: Status: ACTIVE | Noted: 2018-03-22

## 2025-06-12 PROBLEM — I10 HYPERTENSION: Status: ACTIVE | Noted: 2018-07-11

## 2025-06-12 PROCEDURE — 3074F SYST BP LT 130 MM HG: CPT

## 2025-06-12 PROCEDURE — 90677 PCV20 VACCINE IM: CPT

## 2025-06-12 PROCEDURE — 90471 IMMUNIZATION ADMIN: CPT

## 2025-06-12 PROCEDURE — 99214 OFFICE O/P EST MOD 30 MIN: CPT | Mod: 25

## 2025-06-12 PROCEDURE — 3078F DIAST BP <80 MM HG: CPT

## 2025-06-12 ASSESSMENT — ENCOUNTER SYMPTOMS
ABDOMINAL PAIN: 0
COUGH: 0
HEADACHES: 0
BLURRED VISION: 0
MYALGIAS: 0
SHORTNESS OF BREATH: 0
BACK PAIN: 1
CONSTIPATION: 0
DIARRHEA: 0
DIZZINESS: 0
FEVER: 0
NAUSEA: 0
VOMITING: 0
WEIGHT LOSS: 0
CHILLS: 0
WEAKNESS: 0

## 2025-06-12 ASSESSMENT — FIBROSIS 4 INDEX: FIB4 SCORE: 2.43

## 2025-06-12 NOTE — PROGRESS NOTES
Verbal consent was acquired by the patient to use [email or web address] ambient listening note generation during this visit  Subjective:     CC:  The primary encounter diagnosis was Hypercholesterolemia. Diagnoses of Prediabetes, Acquired hypothyroidism, Need for vaccination, and Hypogonadism in male were also pertinent to this visit.    HISTORY OF THE PRESENT ILLNESS: Patient is a 65 y.o. male.   Problem   Hypogonadism in Male   Primary Osteoarthritis of Both Hips   Cervical Spinal Stenosis   Hypertension    Amlodipine started July 2018 great response.  Stopped dec 2018. BP ok. May retart in future     Benign Prostatic Hyperplasia With Urinary Obstruction    Used tamsulosin I the past from urology. Does not want to use it again     Degenerative Disc Disease, Lumbar    HAS HAS INJECTIONS and PHYSICAL THERAPY.  Also had laminectomy  Will take the disc of MRi to his previous spine docs     Postablative Hypothyroidism       History of Present Illness  The patient presents for evaluation of chronic low back pain, hypogonadism, hypothyroidism, prediabetes, and hypercholesterolemia.    Chronic Low Back Pain  - The patient has been managing chronic low back pain, which commenced at the age of 51, for approximately 14 years.  - The pain is of sufficient severity to restrict his ability to sit or stand for prolonged periods.  - He has sought treatment from another provider due to the geographical distance of our facility, receiving injections as part of his pain management regimen.  - An MRI was conducted in November 2024, primarily focusing on his cervical spine.  - He remains under the care of Dr. Emerson.    Hypogonadism  - His testosterone levels were recently evaluated by Dr. Spring, who subsequently increased his dosage.  - The patient reports a significant improvement in energy levels since initiating testosterone therapy, enabling him to complete tasks that were previously challenging due to fatigue.  - He notes that his lower  "back pain limits the duration of his work hours.  - He has not yet collected his new prescription but believes it remains unchanged.  - He is currently using a transdermal gel pump for administration.  - A re-evaluation of his testosterone levels is scheduled for the following month.  - He is under the care of [facility].    Hypothyroidism  - The patient continues to take levothyroxine 100 mcg daily for the management of hypothyroidism.    Prediabetes  - He has a history of prediabetes.    Hypercholesterolemia  - He has been diagnosed with hypercholesterolemia.    ROS:   Review of Systems   Constitutional:  Negative for chills, fever, malaise/fatigue and weight loss.   Eyes:  Negative for blurred vision.   Respiratory:  Negative for cough and shortness of breath.    Cardiovascular:  Negative for chest pain.   Gastrointestinal:  Negative for abdominal pain, constipation, diarrhea, nausea and vomiting.   Musculoskeletal:  Positive for back pain and joint pain. Negative for myalgias.   Neurological:  Negative for dizziness, weakness and headaches.         Objective:     Exam: BP 90/70 (BP Location: Left arm, Patient Position: Sitting, BP Cuff Size: Adult)   Pulse 75   Temp 36.4 °C (97.6 °F) (Temporal)   Ht 1.727 m (5' 8\")   Wt 68.9 kg (151 lb 12.8 oz)   SpO2 96%  Body mass index is 23.08 kg/m².    Physical Exam  Constitutional:       Appearance: Normal appearance.   HENT:      Head: Normocephalic.   Eyes:      Conjunctiva/sclera: Conjunctivae normal.      Pupils: Pupils are equal, round, and reactive to light.   Cardiovascular:      Rate and Rhythm: Normal rate and regular rhythm.      Heart sounds: No murmur heard.  Pulmonary:      Effort: Pulmonary effort is normal. No respiratory distress.      Breath sounds: Normal breath sounds.   Musculoskeletal:         General: Normal range of motion.   Skin:     General: Skin is warm and dry.   Neurological:      General: No focal deficit present.      Mental Status: He " is alert and oriented to person, place, and time.   Psychiatric:         Mood and Affect: Mood normal.         Behavior: Behavior normal.           Labs:   Hospital Outpatient Visit on 05/21/2025   Component Date Value    Sodium 05/21/2025 142     Potassium 05/21/2025 4.6     Chloride 05/21/2025 107     Co2 05/21/2025 25     Anion Gap 05/21/2025 10.0     Glucose 05/21/2025 98     Bun 05/21/2025 23 (H)     Creatinine 05/21/2025 1.10     Calcium 05/21/2025 9.2     Correct Calcium 05/21/2025 9.1     AST(SGOT) 05/21/2025 30     ALT(SGPT) 05/21/2025 25     Alkaline Phosphatase 05/21/2025 46     Total Bilirubin 05/21/2025 1.1     Albumin 05/21/2025 4.1     Total Protein 05/21/2025 6.8     Globulin 05/21/2025 2.7     A-G Ratio 05/21/2025 1.5     Testosterone,Total 05/21/2025 268     WBC 05/21/2025 3.8 (L)     RBC 05/21/2025 5.02     Hemoglobin 05/21/2025 15.6     Hematocrit 05/21/2025 48.0     MCV 05/21/2025 95.6     MCH 05/21/2025 31.1     MCHC 05/21/2025 32.5     RDW 05/21/2025 48.7     Platelet Count 05/21/2025 158 (L)     MPV 05/21/2025 10.3     GFR (CKD-EPI) 05/21/2025 75     Estradiol-E2 05/21/2025 31        Assessment & Plan: Medical Decision Making   65 y.o. male with the following -    Assessment & Plan  1. Degenerative disc disease.  - Diagnosed with significant degenerative disc disease affecting both the cervical and lumbar regions of the spine.  - Managed by a pain management specialist with interventions to alleviate symptoms.  - Chronic pain and functional limitations exacerbated by prolonged sitting or maintaining fixed positions.  - A letter will be provided to excuse him from jury duty due to his ongoing medical condition.    2. Testosterone deficiency.  - Testosterone levels have shown improvement but remain below the optimal range.  - Reports increased energy levels since starting testosterone therapy.  - Advised to continue with the current dosage of AndroGel as recommended by Dr. Spring.  -  Testosterone levels will be rechecked in a month.    3. Hypothyroidism.  - Currently taking levothyroxine 100 mcg daily for hypothyroidism.  - Thyroid function will be rechecked during the next lab visit, as it has not been assessed in a year.    4. Prediabetes.  - History of prediabetes.  - Advised to continue exercising and eating healthy to manage this condition.  - An A1c test will be added to the next lab work to monitor blood sugar levels.    5. Hypercholesterolemia.  - High cholesterol.  - Advised to continue exercising and eating healthy to manage this condition.  - A lipid panel will be added to the next lab work to monitor cholesterol levels.    6. Health maintenance.  - White blood cell count and platelet count are slightly low, but this is a consistent trend.  - Metabolic panel indicates normal liver and kidney function, as well as electrolyte balance.  - Advised to continue taking vitamins.  - A pneumonia vaccine will be administered today.    Follow-up  - Follow up in 1 year or sooner if needed.      Problem List Items Addressed This Visit       Acquired hypothyroidism    Relevant Orders    TSH+FREE T4    Hypercholesterolemia - Primary    Relevant Orders    Lipid Profile    Prediabetes    Relevant Orders    HEMOGLOBIN A1C    Hypogonadism in male     Other Visit Diagnoses         Need for vaccination        Relevant Orders    Pneumococcal Conjugate Vaccine 20-Valent (6 wks+)            Differential diagnosis, natural history, supportive care, and indications for immediate follow-up discussed.  Shared decision making approach was utilized, and patient is amendable with plan of care.  Patient understands to return to clinic or go to the emergency department if symptoms worsen. All questions and concerns addressed to the best of my knowledge.      Return in about 1 year (around 6/12/2026), or if symptoms worsen or fail to improve.    Please note that this dictation was created using voice recognition  software. I have made every reasonable attempt to correct obvious errors, but I expect that there are errors of grammar and possibly content that I did not discover before finalizing the note.

## 2025-06-12 NOTE — LETTER
June 12, 2025    To Whom it May Concern,    I am writing on behalf of my patient Lon Swanson, who has been summoned to jury duty.  He is under my care for significant degenerative disc disease involving both the cervical and lumbar spine.  He is also under the care of of pain management specialist for interventions due to this condition.    Due to the severity of the condition, he experiences chronic pain and functional limitations that are exacerbated by prolonged sitting or remaining in fixed positions.  Participation in jury duty, which typically requires extended periods of sitting, would likely result in any substantial worsening of his symptoms and may jeopardize his health.    In my medical opinion, he is not medically fit to serve on a jury at this time.  I respectfully request that he be excused from jury duty on the basis of his ongoing medical condition.    Please feel free to contact my office should you require any additional information or documentation.    Yours in good health,    Juan Carlos Bueno DNP, APRN  Family Medicine Nurse Practitioner  [facility] [place]  [phone]                                  Juan Carlos Bueno D.N.P.

## 2025-06-12 NOTE — Clinical Note
June 12, 2025        Lon Swanson  [address] [place] [address]        Dear Lon:    ***    If you have any questions or concerns, please don't hesitate to call.        Sincerely,        Juan Carlos Bueno D.N.P.    Electronically Signed

## 2025-08-12 ENCOUNTER — HOSPITAL ENCOUNTER (OUTPATIENT)
Dept: LAB | Facility: MEDICAL CENTER | Age: 65
End: 2025-08-12
Attending: STUDENT IN AN ORGANIZED HEALTH CARE EDUCATION/TRAINING PROGRAM
Payer: COMMERCIAL

## 2025-08-12 ENCOUNTER — HOSPITAL ENCOUNTER (OUTPATIENT)
Dept: LAB | Facility: MEDICAL CENTER | Age: 65
End: 2025-08-12
Payer: COMMERCIAL

## 2025-08-12 DIAGNOSIS — E29.1 HYPOGONADISM IN MALE: ICD-10-CM

## 2025-08-12 DIAGNOSIS — R73.03 PREDIABETES: ICD-10-CM

## 2025-08-12 DIAGNOSIS — E78.00 HYPERCHOLESTEROLEMIA: ICD-10-CM

## 2025-08-12 LAB
ALBUMIN SERPL BCP-MCNC: 4.1 G/DL (ref 3.2–4.9)
ALBUMIN/GLOB SERPL: 1.4 G/DL
ALP SERPL-CCNC: 49 U/L (ref 30–99)
ALT SERPL-CCNC: 23 U/L (ref 2–50)
ANION GAP SERPL CALC-SCNC: 10 MMOL/L (ref 7–16)
AST SERPL-CCNC: 25 U/L (ref 12–45)
BILIRUB SERPL-MCNC: 1.5 MG/DL (ref 0.1–1.5)
BUN SERPL-MCNC: 16 MG/DL (ref 8–22)
CALCIUM ALBUM COR SERPL-MCNC: 9.2 MG/DL (ref 8.5–10.5)
CALCIUM SERPL-MCNC: 9.3 MG/DL (ref 8.5–10.5)
CHLORIDE SERPL-SCNC: 105 MMOL/L (ref 96–112)
CHOLEST SERPL-MCNC: 179 MG/DL (ref 100–199)
CO2 SERPL-SCNC: 26 MMOL/L (ref 20–33)
CREAT SERPL-MCNC: 1.24 MG/DL (ref 0.5–1.4)
ERYTHROCYTE [DISTWIDTH] IN BLOOD BY AUTOMATED COUNT: 51.6 FL (ref 35.9–50)
EST. AVERAGE GLUCOSE BLD GHB EST-MCNC: 123 MG/DL
ESTRADIOL SERPL-MCNC: 36.5 PG/ML
FASTING STATUS PATIENT QL REPORTED: NORMAL
GFR SERPLBLD CREATININE-BSD FMLA CKD-EPI: 65 ML/MIN/1.73 M 2
GLOBULIN SER CALC-MCNC: 2.9 G/DL (ref 1.9–3.5)
GLUCOSE SERPL-MCNC: 90 MG/DL (ref 65–99)
HBA1C MFR BLD: 5.9 % (ref 4–5.6)
HCT VFR BLD AUTO: 51.7 % (ref 42–52)
HDLC SERPL-MCNC: 47 MG/DL
HGB BLD-MCNC: 17.3 G/DL (ref 14–18)
LDLC SERPL CALC-MCNC: 116 MG/DL
MCH RBC QN AUTO: 31.6 PG (ref 27–33)
MCHC RBC AUTO-ENTMCNC: 33.5 G/DL (ref 32.3–36.5)
MCV RBC AUTO: 94.5 FL (ref 81.4–97.8)
PLATELET # BLD AUTO: 164 K/UL (ref 164–446)
PMV BLD AUTO: 10.3 FL (ref 9–12.9)
POTASSIUM SERPL-SCNC: 4.7 MMOL/L (ref 3.6–5.5)
PROT SERPL-MCNC: 7 G/DL (ref 6–8.2)
RBC # BLD AUTO: 5.47 M/UL (ref 4.7–6.1)
SODIUM SERPL-SCNC: 141 MMOL/L (ref 135–145)
T4 FREE SERPL-MCNC: 1.54 NG/DL (ref 0.93–1.7)
TESTOST SERPL-MCNC: 507 NG/DL (ref 175–781)
TRIGL SERPL-MCNC: 81 MG/DL (ref 0–149)
TSH SERPL-ACNC: 1.68 UIU/ML (ref 0.38–5.33)
WBC # BLD AUTO: 4.4 K/UL (ref 4.8–10.8)

## 2025-08-12 PROCEDURE — 80061 LIPID PANEL: CPT

## 2025-08-12 PROCEDURE — 82670 ASSAY OF TOTAL ESTRADIOL: CPT

## 2025-08-12 PROCEDURE — 83036 HEMOGLOBIN GLYCOSYLATED A1C: CPT

## 2025-08-12 PROCEDURE — 80053 COMPREHEN METABOLIC PANEL: CPT

## 2025-08-12 PROCEDURE — 84403 ASSAY OF TOTAL TESTOSTERONE: CPT

## 2025-08-12 PROCEDURE — 36415 COLL VENOUS BLD VENIPUNCTURE: CPT

## 2025-08-12 PROCEDURE — 84439 ASSAY OF FREE THYROXINE: CPT

## 2025-08-12 PROCEDURE — 84443 ASSAY THYROID STIM HORMONE: CPT

## 2025-08-12 PROCEDURE — 85027 COMPLETE CBC AUTOMATED: CPT

## 2025-08-13 DIAGNOSIS — E29.1 HYPOGONADISM IN MALE: Primary | ICD-10-CM

## 2025-08-13 RX ORDER — TESTOSTERONE 1.62 MG/G
GEL TRANSDERMAL
Qty: 300 G | Refills: 2 | Status: SHIPPED | OUTPATIENT
Start: 2025-08-13 | End: 2025-11-17